# Patient Record
Sex: MALE | Race: WHITE | Employment: FULL TIME | URBAN - METROPOLITAN AREA
[De-identification: names, ages, dates, MRNs, and addresses within clinical notes are randomized per-mention and may not be internally consistent; named-entity substitution may affect disease eponyms.]

---

## 2022-05-16 ENCOUNTER — ANCILLARY PROCEDURE (OUTPATIENT)
Dept: GENERAL RADIOLOGY | Facility: CLINIC | Age: 22
End: 2022-05-16
Attending: EMERGENCY MEDICINE

## 2022-05-16 ENCOUNTER — OFFICE VISIT (OUTPATIENT)
Dept: URGENT CARE | Facility: URGENT CARE | Age: 22
End: 2022-05-16

## 2022-05-16 VITALS
WEIGHT: 182.4 LBS | HEIGHT: 73 IN | DIASTOLIC BLOOD PRESSURE: 85 MMHG | TEMPERATURE: 97.9 F | SYSTOLIC BLOOD PRESSURE: 144 MMHG | BODY MASS INDEX: 24.18 KG/M2 | OXYGEN SATURATION: 98 % | HEART RATE: 78 BPM

## 2022-05-16 DIAGNOSIS — S99.921A FOOT INJURY, RIGHT, INITIAL ENCOUNTER: ICD-10-CM

## 2022-05-16 DIAGNOSIS — S99.921A FOOT INJURY, RIGHT, INITIAL ENCOUNTER: Primary | ICD-10-CM

## 2022-05-16 PROCEDURE — 99203 OFFICE O/P NEW LOW 30 MIN: CPT | Performed by: EMERGENCY MEDICINE

## 2022-05-16 PROCEDURE — 73630 X-RAY EXAM OF FOOT: CPT | Mod: TC | Performed by: RADIOLOGY

## 2022-05-16 NOTE — LETTER
[facility] [address] [address]          May 16, 2022    RE:  Angel Saenz                                                                                                                                                       [address] [place] [id number]            To whom it may concern:    Angel Saenz is under my professional care for Foot injury, right, initial encounter He  may return to work with the following: The employee is UNABLE to return to work until 5/18/2022. Please excuse his absence 5/16/2022.     Sincerely,        Mak Pickering PA-C

## 2022-05-16 NOTE — PROGRESS NOTES
Assessment & Plan     Diagnosis:    (A89.924O) Foot injury, right, initial encounter  (primary encounter diagnosis)      Medical Decision Making:  Angel Saenz is a 22 year old male presents for evaluation after dropping a bed frame on the right foot yesterday at work.  Signs and symptoms are consistent with a contusion.  A broad differential was considered including sprain, strain, fracture, tendon rupture, nerve impingement/compromise, referred pain. X-rays show no evidence of fracture or malalignment. Distal CMS intact in the RLE. Supportive outpatient management is indicated.  Rest, ice, and elevation treatment was discussed with the patient.    Close follow-up with patient's primary care physician per discharge precautions. Contusion discharge instructions given for home.     Patient voices understanding and agreement with the plan including reasons to go to the ER immediately as well as to be seen by a more consistent care-giver, such as their PCP, if the symptoms persist more than 7 days.       Mak Pickering PA-C  [facility]    Subjective     Angel Saenz is a 22 year old male who presents to clinic today for the following health issues:  Chief Complaint   Patient presents with     Foot Swelling     Foot Pain     Right foot pain. Bed frame fell on it on Friday.       HPI    MS Injury/Pain    Onset of symptoms was 1 day(s) ago.  Location: right foot  Context:       The injury happened while at work      Mechanism: dropped a bed frame onto the foot      Patient experienced immediate pain, delayed swelling, was able to bear weight directly after injury, no deformity was noted by the patient  Course of symptoms is waxing and waning.    Severity moderate  Current and Associated symptoms: Pain, Swelling, Bruising and Decreased range of motion  Denies  Warmth and Redness  Aggravating Factors: walking and weight-bearing  Therapies to improve symptoms include: ice and elevation    Patient  "describes the symptoms as \"it hurts a lot to walk on.\"    Patient denies any numbness or weakness in the leg/foot, ankle or knee pain, color changes or inability to walk or bear weight.     Review of Systems    See HPI    Objective      Vitals: BP (!) 144/85 (BP Location: Left arm, Patient Position: Sitting, Cuff Size: Adult Regular)   Pulse 78   Temp 97.9  F (36.6  C) (Tympanic)   Ht 1.854 m (6' 1\")   Wt 82.7 kg (182 lb 6.4 oz)   SpO2 98%   BMI 24.06 kg/m      Patient Vitals for the past 24 hrs:   BP Temp Temp src Pulse SpO2 Height Weight   05/16/22 1727 (!) 144/85 97.9  F (36.6  C) Tympanic 78 98 % 1.854 m (6' 1\") 82.7 kg (182 lb 6.4 oz)       Vital signs reviewed by: Mak Pickering PA-C    Physical Exam   Constitutional: Patient is alert and cooperative. Mild acute distress.  Neurological: Alert and oriented x3. Strength and sensation are intact and symmetric in the bilateral lower extremities.   Cardiovascular: 2+ DP/PT pulses in the bilateral lower extremities.  MSK: Tenderness and swelling over the dorsum of the mid-foot; lateral aspect. Flexion/extension intact at the ankle and all toes. Negative Jones test. Gait is stable.   Skin: No rash noted on visualized skin.  Psychiatric:The patient has a normal mood and affect.     Labs/Imaging:  Results for orders placed or performed in visit on 05/16/22   XR Foot Right G/E 3 Views     Status: None    Narrative    EXAM: XR FOOT RIGHT G/E 3 VIEWS  LOCATION: [facility]  DATE/TIME: 5/16/2022 5:36 PM    INDICATION: Right foot injury and pain.    COMPARISON: None.      Impression    IMPRESSION: Normal joint spaces and alignment. No fracture.             Mak Pickering PA-C, May 16, 2022      "